# Patient Record
Sex: FEMALE | Race: WHITE | Employment: OTHER | URBAN - METROPOLITAN AREA
[De-identification: names, ages, dates, MRNs, and addresses within clinical notes are randomized per-mention and may not be internally consistent; named-entity substitution may affect disease eponyms.]

---

## 2018-08-03 ENCOUNTER — HOSPITAL ENCOUNTER (OUTPATIENT)
Dept: LAB | Age: 83
Discharge: HOME | End: 2018-08-03
Payer: MEDICARE

## 2018-08-03 DIAGNOSIS — E55.9 AVITAMINOSIS D: ICD-10-CM

## 2018-08-03 LAB
25(OH)D3 SERPL-MCNC: 39.3 NG/ML (ref 30–100)
CHOLEST SERPL-MCNC: 176 MG/DL
HDLC SERPL-MCNC: 65 MG/DL (ref 40–60)
HDLC SERPL: 2.7 {RATIO} (ref 0–5)
LDLC SERPL CALC-MCNC: 90.8 MG/DL (ref 0–100)
LIPID PROFILE,FLP: ABNORMAL
TRIGL SERPL-MCNC: 101 MG/DL (ref ?–150)
VLDLC SERPL CALC-MCNC: 20.2 MG/DL

## 2018-08-03 PROCEDURE — 80061 LIPID PANEL: CPT | Performed by: INTERNAL MEDICINE

## 2018-08-03 PROCEDURE — 36415 COLL VENOUS BLD VENIPUNCTURE: CPT | Performed by: INTERNAL MEDICINE

## 2018-08-03 PROCEDURE — 82306 VITAMIN D 25 HYDROXY: CPT | Performed by: INTERNAL MEDICINE

## 2019-03-25 ENCOUNTER — HOSPITAL ENCOUNTER (EMERGENCY)
Age: 84
Discharge: HOME | End: 2019-03-25
Attending: EMERGENCY MEDICINE
Payer: MEDICARE

## 2019-03-25 VITALS
OXYGEN SATURATION: 93 % | HEART RATE: 93 BPM | TEMPERATURE: 97.9 F | RESPIRATION RATE: 15 BRPM | DIASTOLIC BLOOD PRESSURE: 86 MMHG | SYSTOLIC BLOOD PRESSURE: 152 MMHG

## 2019-03-25 DIAGNOSIS — N39.0 ACUTE UTI: ICD-10-CM

## 2019-03-25 DIAGNOSIS — E86.0 MODERATE DEHYDRATION: ICD-10-CM

## 2019-03-25 DIAGNOSIS — R11.2 NAUSEA VOMITING AND DIARRHEA: Primary | ICD-10-CM

## 2019-03-25 DIAGNOSIS — R19.7 NAUSEA VOMITING AND DIARRHEA: Primary | ICD-10-CM

## 2019-03-25 LAB
ANION GAP SERPL CALC-SCNC: 9 MMOL/L (ref 3–18)
APPEARANCE UR: CLEAR
BACTERIA URNS QL MICRO: ABNORMAL /HPF
BASOPHILS # BLD: 0 K/UL (ref 0–0.1)
BASOPHILS NFR BLD: 0 % (ref 0–2)
BILIRUB UR QL: NEGATIVE
BUN SERPL-MCNC: 14 MG/DL (ref 7–18)
BUN/CREAT SERPL: 18 (ref 12–20)
CALCIUM SERPL-MCNC: 8.3 MG/DL (ref 8.5–10.1)
CHLORIDE SERPL-SCNC: 98 MMOL/L (ref 100–108)
CO2 SERPL-SCNC: 28 MMOL/L (ref 21–32)
COLOR UR: YELLOW
CREAT SERPL-MCNC: 0.79 MG/DL (ref 0.6–1.3)
DIFFERENTIAL METHOD BLD: ABNORMAL
EOSINOPHIL # BLD: 0 K/UL (ref 0–0.4)
EOSINOPHIL NFR BLD: 0 % (ref 0–5)
EPITH CASTS URNS QL MICRO: ABNORMAL /LPF (ref 0–5)
ERYTHROCYTE [DISTWIDTH] IN BLOOD BY AUTOMATED COUNT: 13.3 % (ref 11.6–14.5)
GLUCOSE SERPL-MCNC: 109 MG/DL (ref 74–99)
GLUCOSE UR STRIP.AUTO-MCNC: NEGATIVE MG/DL
HCT VFR BLD AUTO: 45.7 % (ref 35–45)
HGB BLD-MCNC: 15.1 G/DL (ref 12–16)
HGB UR QL STRIP: NEGATIVE
KETONES UR QL STRIP.AUTO: 15 MG/DL
LEUKOCYTE ESTERASE UR QL STRIP.AUTO: ABNORMAL
LYMPHOCYTES # BLD: 2.2 K/UL (ref 0.9–3.6)
LYMPHOCYTES NFR BLD: 28 % (ref 21–52)
MCH RBC QN AUTO: 30.3 PG (ref 24–34)
MCHC RBC AUTO-ENTMCNC: 33 G/DL (ref 31–37)
MCV RBC AUTO: 91.6 FL (ref 74–97)
MONOCYTES # BLD: 0.5 K/UL (ref 0.05–1.2)
MONOCYTES NFR BLD: 6 % (ref 3–10)
NEUTS SEG # BLD: 5.1 K/UL (ref 1.8–8)
NEUTS SEG NFR BLD: 66 % (ref 40–73)
NITRITE UR QL STRIP.AUTO: NEGATIVE
PH UR STRIP: 7 [PH] (ref 5–8)
PLATELET # BLD AUTO: 160 K/UL (ref 135–420)
PMV BLD AUTO: 10 FL (ref 9.2–11.8)
POTASSIUM SERPL-SCNC: 3.9 MMOL/L (ref 3.5–5.5)
PROT UR STRIP-MCNC: NEGATIVE MG/DL
RBC # BLD AUTO: 4.99 M/UL (ref 4.2–5.3)
RBC #/AREA URNS HPF: ABNORMAL /HPF (ref 0–5)
SODIUM SERPL-SCNC: 135 MMOL/L (ref 136–145)
SP GR UR REFRACTOMETRY: 1.01 (ref 1–1.03)
UROBILINOGEN UR QL STRIP.AUTO: 0.2 EU/DL (ref 0.2–1)
WBC # BLD AUTO: 7.8 K/UL (ref 4.6–13.2)
WBC URNS QL MICRO: ABNORMAL /HPF (ref 0–4)

## 2019-03-25 PROCEDURE — 99285 EMERGENCY DEPT VISIT HI MDM: CPT

## 2019-03-25 PROCEDURE — 74011250636 HC RX REV CODE- 250/636: Performed by: EMERGENCY MEDICINE

## 2019-03-25 PROCEDURE — 96374 THER/PROPH/DIAG INJ IV PUSH: CPT

## 2019-03-25 PROCEDURE — 87077 CULTURE AEROBIC IDENTIFY: CPT

## 2019-03-25 PROCEDURE — 96361 HYDRATE IV INFUSION ADD-ON: CPT

## 2019-03-25 PROCEDURE — 80048 BASIC METABOLIC PNL TOTAL CA: CPT

## 2019-03-25 PROCEDURE — 85025 COMPLETE CBC W/AUTO DIFF WBC: CPT

## 2019-03-25 PROCEDURE — 81001 URINALYSIS AUTO W/SCOPE: CPT

## 2019-03-25 PROCEDURE — 87086 URINE CULTURE/COLONY COUNT: CPT

## 2019-03-25 RX ORDER — CEPHALEXIN 500 MG/1
500 CAPSULE ORAL 3 TIMES DAILY
Qty: 15 CAP | Refills: 0 | Status: SHIPPED | OUTPATIENT
Start: 2019-03-25 | End: 2019-03-30

## 2019-03-25 RX ORDER — ONDANSETRON 4 MG/1
4 TABLET, FILM COATED ORAL
Qty: 12 TAB | Refills: 0 | Status: SHIPPED | OUTPATIENT
Start: 2019-03-25

## 2019-03-25 RX ORDER — ONDANSETRON 2 MG/ML
4 INJECTION INTRAMUSCULAR; INTRAVENOUS
Status: COMPLETED | OUTPATIENT
Start: 2019-03-25 | End: 2019-03-25

## 2019-03-25 RX ADMIN — SODIUM CHLORIDE 1000 ML: 900 INJECTION, SOLUTION INTRAVENOUS at 09:59

## 2019-03-25 RX ADMIN — SODIUM CHLORIDE 500 ML: 900 INJECTION, SOLUTION INTRAVENOUS at 09:59

## 2019-03-25 RX ADMIN — ONDANSETRON 4 MG: 2 INJECTION INTRAMUSCULAR; INTRAVENOUS at 09:59

## 2019-03-25 NOTE — ED PROVIDER NOTES
[facility] PHYSICAL EXAM  
 
 
Date: 3/25/2019 Patient Name: Fina Ayala YOB: 1935 Medical Record Number: [id number] HISTORY OF PRESENTING ILLNESS:  
 
Fina Ayala is a 81 y.o. female presenting with the noted PMH to the ED c/o mild persistent nausea over the past 4 days. Patient reports nausea vomiting and diarrhea started Friday morning, her diarrhea has resolved and her last episode of emesis was after eating oatmeal yesterday morning. She has not taken her morning blood pressure medications today. She denies fever, recent antibiotic use and abdominal pain. Primary Care Provider: Mila Mistry MD  
Specialist: 
 
Past Medical History:  
Past Medical History:  
Diagnosis Date  Hypertension 3yrs  Osteoarthritis of left hip 2015  Osteoarthritis of right hip 2013  Other ill-defined conditions(799.89) cholesterol  Thyroid disease H/O in the past on rx Past Surgical History:  
Past Surgical History:  
Procedure Laterality Date  HX CATARACT REMOVAL Bilateral   
 HX GYN  1960  
 laparotomy ruptured ovarian cyst  
 HX HEENT  1990  
 sinus [address] [place]  
 HX OOPHORECTOMY  HX ORTHOPAEDIC Bilateral   
 bunionectomy  HX TONSILLECTOMY  TOTAL HIP ARTHROPLASTY Right Social History:  
Social History Tobacco Use  Smoking status: Former Smoker Last attempt to quit: 1987 Years since quittin.2  Smokeless tobacco: Never Used Substance Use Topics  Alcohol use: Yes Alcohol/week: 0.5 oz Types: 1 Glasses of wine per week  Drug use: No  
  
 
Allergies:  
No Known Allergies REVIEW OF SYSTEMS: 
Review of Systems Constitutional: Negative for chills and fever. HENT: Negative for ear pain and sore throat. Eyes: Negative for pain and visual disturbance. Respiratory: Negative for cough and shortness of breath. Cardiovascular: Negative for chest pain and palpitations. Gastrointestinal: Positive for diarrhea, nausea and vomiting. Negative for abdominal pain. Genitourinary: Negative for flank pain. Musculoskeletal: Negative for back pain and neck pain. Neurological: Negative for syncope and headaches. Psychiatric/Behavioral: Negative for agitation. The patient is not nervous/anxious. PHYSICAL EXAM: 
Vitals:  
 03/25/19 1000 03/25/19 1030 03/25/19 1100 03/25/19 1130 BP: 150/79  157/82 152/86 Pulse:      
Resp:      
Temp:      
SpO2: 92% 94% 93% 93% Physical Exam  
Constitutional: She is oriented to person, place, and time. She appears well-developed and well-nourished. No distress. HENT:  
Head: Normocephalic and atraumatic. Mouth/Throat: Oropharynx is clear and moist.  
Eyes: Pupils are equal, round, and reactive to light. No scleral icterus. Neck: Neck supple. No tracheal deviation present. Cardiovascular: Regular rhythm and intact distal pulses. No murmur heard. Intermittently mildly tachycardic Pulmonary/Chest: Effort normal and breath sounds normal. No respiratory distress. Abdominal: Soft. She exhibits no distension. There is no tenderness. There is no rebound and no guarding. Musculoskeletal: Normal range of motion. She exhibits no edema, tenderness or deformity. Neurological: She is alert and oriented to person, place, and time. No cranial nerve deficit. No gross neuro deficit Skin: Skin is warm and dry. No rash noted. She is not diaphoretic. Poor skin turgor Psychiatric: She has a normal mood and affect. Her behavior is normal.  
Nursing note and vitals reviewed. Medications  
ondansetron (ZOFRAN) injection 4 mg (4 mg IntraVENous Given 3/25/19 0959)  
sodium chloride 0.9 % bolus infusion 1,000 mL (1,000 mL IntraVENous New Bag 3/25/19 0959)   Followed by  
 sodium chloride 0.9 % bolus infusion 500 mL (500 mL IntraVENous New Bag 3/25/19 0959) RESULTS: 
 
Labs - Labs Reviewed METABOLIC PANEL, BASIC - Abnormal; Notable for the following components:  
    Result Value Sodium 135 (*) Chloride 98 (*) Glucose 109 (*) Calcium 8.3 (*) All other components within normal limits CBC WITH AUTOMATED DIFF - Abnormal; Notable for the following components: HCT 45.7 (*) All other components within normal limits URINALYSIS W/ RFLX MICROSCOPIC - Abnormal; Notable for the following components:  
 Ketone 15 (*) Leukocyte Esterase SMALL (*) All other components within normal limits URINE MICROSCOPIC ONLY - Abnormal; Notable for the following components:  
 Bacteria FEW (*) All other components within normal limits CULTURE, URINE Radiologic Studies - No results found. MEDICAL DECISION MAKING 
 
DDX: Dehydration, electrolyte disturbance, AK I, viral gastroenteritis, medication adverse effect less likely. 81 y.o. female with noted past medical history who presented with nausea vomiting and diarrhea onset 4 days ago with persistent nausea only, benign abdominal exam.  Well-appearing female with mild intermittent tachycardia and poor skin turgor suggestive of dehydration. Will give IV fluids check basic labs and provide his nausea medication here and for home hydration and brat diet counseling given. Reassuring labs, tachycardia resolved, BP normalized. Acute UTI found, abx Rx given for home. Patient has no new complaints, changes, or physical findings. Results were reviewed with the patient. Pt's questions and concerns were addressed. Care plan was outlined, including follow-up with PCP/specialist and return precautions were discussed. Patient is felt to be stable for discharge at this time. Diagnosis Clinical Impression: 1. Nausea vomiting and diarrhea 2. Moderate dehydration 3. Acute UTI Follow-up Information Follow up With Specialties Details Why Contact [facility] EMERGENCY DEPT Emergency Medicine  If symptoms worsen [address] 
[phone] Lorrie Finney MD Internal Medicine Schedule an appointment as soon as possible for a visit  [place] [address] [place] [address] [phone] [facility] [address] 
[phone] Current Discharge Medication List  
  
START taking these medications Details  
ondansetron hcl (ZOFRAN) 4 mg tablet Take 1 Tab by mouth every eight (8) hours as needed for Nausea. Qty: 12 Tab, Refills: 0  
  
cephALEXin (KEFLEX) 500 mg capsule Take 1 Cap by mouth three (3) times daily for 5 days. Qty: 15 Cap, Refills: 0  
  
  
 
 
_______________________________ Attestations:

## 2019-03-27 LAB
BACTERIA SPEC CULT: ABNORMAL
BACTERIA SPEC CULT: ABNORMAL
SERVICE CMNT-IMP: ABNORMAL